# Patient Record
Sex: FEMALE | Race: WHITE | URBAN - METROPOLITAN AREA
[De-identification: names, ages, dates, MRNs, and addresses within clinical notes are randomized per-mention and may not be internally consistent; named-entity substitution may affect disease eponyms.]

---

## 2019-07-10 ENCOUNTER — OFFICE VISIT (OUTPATIENT)
Dept: FAMILY MEDICINE CLINIC | Age: 24
End: 2019-07-10

## 2019-07-10 VITALS
TEMPERATURE: 97.6 F | OXYGEN SATURATION: 98 % | SYSTOLIC BLOOD PRESSURE: 121 MMHG | BODY MASS INDEX: 25.31 KG/M2 | DIASTOLIC BLOOD PRESSURE: 77 MMHG | HEART RATE: 74 BPM | HEIGHT: 68 IN | WEIGHT: 167 LBS

## 2019-07-10 DIAGNOSIS — J01.00 ACUTE NON-RECURRENT MAXILLARY SINUSITIS: Primary | ICD-10-CM

## 2019-07-10 PROCEDURE — 99201 PR OFFICE OUTPATIENT NEW 10 MINUTES: CPT | Performed by: NURSE PRACTITIONER

## 2019-07-10 RX ORDER — AMOXICILLIN AND CLAVULANATE POTASSIUM 875; 125 MG/1; MG/1
1 TABLET, FILM COATED ORAL 2 TIMES DAILY
Qty: 14 TABLET | Refills: 0 | Status: SHIPPED | OUTPATIENT
Start: 2019-07-10 | End: 2019-07-17

## 2019-07-10 RX ORDER — PSEUDOEPHEDRINE HYDROCHLORIDE 30 MG/1
30 TABLET ORAL EVERY 6 HOURS PRN
Qty: 30 TABLET | Refills: 0 | Status: SHIPPED | OUTPATIENT
Start: 2019-07-10 | End: 2020-07-09

## 2019-07-10 SDOH — HEALTH STABILITY: MENTAL HEALTH: HOW OFTEN DO YOU HAVE A DRINK CONTAINING ALCOHOL?: NEVER

## 2019-07-10 ASSESSMENT — ENCOUNTER SYMPTOMS
SINUS PRESSURE: 1
TROUBLE SWALLOWING: 0
VOMITING: 0
COUGH: 1
ABDOMINAL PAIN: 0
EYE DISCHARGE: 0
NAUSEA: 0
CHEST TIGHTNESS: 0
SINUS PAIN: 1
DIARRHEA: 0
SHORTNESS OF BREATH: 0
EYE ITCHING: 0
ABDOMINAL DISTENTION: 0
VOICE CHANGE: 0
WHEEZING: 0
SORE THROAT: 1

## 2019-07-10 ASSESSMENT — PATIENT HEALTH QUESTIONNAIRE - PHQ9
SUM OF ALL RESPONSES TO PHQ QUESTIONS 1-9: 0
SUM OF ALL RESPONSES TO PHQ9 QUESTIONS 1 & 2: 0
2. FEELING DOWN, DEPRESSED OR HOPELESS: 0
1. LITTLE INTEREST OR PLEASURE IN DOING THINGS: 0
SUM OF ALL RESPONSES TO PHQ QUESTIONS 1-9: 0

## 2019-07-10 NOTE — PROGRESS NOTES
[facility] [id number] [place], [place] [id number]  Dept: [phone]  Dept Fax: [phone]    Nba Persaud is a 30 y.o. female who presents today for her medical conditions/complaints of   Chief Complaint   Patient presents with    Cough     month    Chest Congestion     2 weeks, otc meds     Pharyngitis          HPI:     /77   Pulse 74   Temp 97.6 °F (36.4 °C) (Oral)   Ht 5' 8\" (1.727 m)   Wt 167 lb (75.8 kg)   SpO2 98%   BMI 25.39 kg/m²       HPI  The patient presented to the urgent care today with complaints of sinus pain x 3 weeks. She states it started to improve and then symptoms returned. Reports symptoms have progressively worsened over the last 6 days. Pain is worse with lying down. Pain is located in her face and she feels pain in her teeth. Currently rates pain 5/10. Associated symptoms include congestion, cough, sore throat, and sinus pressure. No fever, chills, ear pain, SOB, sneezing, swollen glands, nausea or vomiting. Pt has tried mucinex with little relief. Non-smoker. History reviewed. No pertinent past medical history. Past Surgical History:   Procedure Laterality Date    LEG SURGERY         Family History   Problem Relation Age of Onset    High Blood Pressure Mother     No Known Problems Father        Social History     Tobacco Use    Smoking status: Never Smoker    Smokeless tobacco: Never Used   Substance Use Topics    Alcohol use: Never     Frequency: Never        Prior to Visit Medications    Medication Sig Taking?  Authorizing Provider   amoxicillin-clavulanate (AUGMENTIN) 875-125 MG per tablet Take 1 tablet by mouth 2 times daily for 7 days Yes LB Mattson CNP   pseudoephedrine (DECONGESTANT) 30 MG tablet Take 1 tablet by mouth every 6 hours as needed for Congestion Yes LB Mattson CNP       No Known Allergies      Subjective:      Review of Systems   Constitutional: Positive for fatigue. Negative for chills and fever. HENT: Positive for congestion, postnasal drip, sinus pressure, sinus pain and sore throat. Negative for ear discharge, ear pain, trouble swallowing and voice change. Eyes: Negative for discharge and itching. Respiratory: Positive for cough. Negative for chest tightness, shortness of breath and wheezing. Cardiovascular: Negative for chest pain, palpitations and leg swelling. Gastrointestinal: Negative for abdominal distention, abdominal pain, diarrhea, nausea and vomiting. Genitourinary: Negative for decreased urine volume, difficulty urinating and dysuria. Musculoskeletal: Negative for gait problem, myalgias and neck stiffness. Skin: Negative for pallor and rash. Neurological: Positive for headaches. Negative for weakness and light-headedness. Psychiatric/Behavioral: Positive for sleep disturbance (due to acute illness). Negative for confusion. Objective:     Physical Exam   Constitutional: She is oriented to person, place, and time. She appears well-developed and well-nourished. No distress. HENT:   Head: Normocephalic and atraumatic. Right Ear: Tympanic membrane, external ear and ear canal normal.   Left Ear: Tympanic membrane, external ear and ear canal normal.   Nose: Mucosal edema and sinus tenderness present. Right sinus exhibits maxillary sinus tenderness. Left sinus exhibits maxillary sinus tenderness. Mouth/Throat: Uvula is midline, oropharynx is clear and moist and mucous membranes are normal. No trismus in the jaw. No uvula swelling. No oropharyngeal exudate. Eyes: Conjunctivae and EOM are normal. Right eye exhibits no discharge. Left eye exhibits no discharge. Neck: Normal range of motion. Neck supple. Cardiovascular: Normal rate, regular rhythm, normal heart sounds and intact distal pulses. Pulmonary/Chest: Effort normal and breath sounds normal. She has no wheezes. She has no rales.  She exhibits no

## 2021-07-21 ENCOUNTER — HOSPITAL ENCOUNTER (OUTPATIENT)
Age: 26
Setting detail: SPECIMEN
Discharge: HOME | End: 2021-07-21

## 2021-07-21 DIAGNOSIS — Z01.84 IMMUNITY STATUS TESTING: ICD-10-CM

## 2021-07-21 DIAGNOSIS — Z01.84 IMMUNITY STATUS TESTING: Primary | ICD-10-CM

## 2021-07-22 LAB — HBV SURFACE AB TITR SER: 47 MIU/ML

## 2021-07-23 LAB — VZV IGG SER QL IA: 2.9
